# Patient Record
Sex: FEMALE | Race: WHITE | URBAN - METROPOLITAN AREA
[De-identification: names, ages, dates, MRNs, and addresses within clinical notes are randomized per-mention and may not be internally consistent; named-entity substitution may affect disease eponyms.]

---

## 2017-03-13 ENCOUNTER — TELEPHONE (OUTPATIENT)
Dept: INTERNAL MEDICINE | Facility: CLINIC | Age: 37
End: 2017-03-13

## 2017-03-13 DIAGNOSIS — R07.0 THROAT PAIN: Primary | ICD-10-CM

## 2017-03-13 NOTE — TELEPHONE ENCOUNTER
Patient called back, she will try to snag a rapid strep swab test where she works and fax it to us.  She does not need Gretel's order sent anywhere.  RN gave main clinic fax number to send results to.    Will leave this open awaiting lab results.    Teresa Andrade RN  [facility]

## 2017-03-13 NOTE — TELEPHONE ENCOUNTER
Patient back to RN Triage line, left VM to reach her best at: [phone].    Teresa Andrade RN  [facility]

## 2017-03-13 NOTE — TELEPHONE ENCOUNTER
Patient needs to complete a strep test. She can either be seen in clinic, or schedule a lab test. I ordered a strep test in case she just wants to make a lab visit. She can schedule a lab appointment to complete. If positive, I will send in a prescription for Abx. Please let me know what pharmacy she prefers.

## 2017-03-13 NOTE — TELEPHONE ENCOUNTER
Reason for call:  Patient reporting a symptom    Symptom or request: Patient suspects that she has strep throat as both of her children tested positive. She has a sore throat and body aches.  She is wondering if she can get a script for Amoxicillin.    Duration (how long have symptoms been present): Since Saturday    Have you been treated for this before? No    Additional comments: [facility] in [place]    Phone Number patient can be reached at:  Cell number on file:    Telephone Information:   Mobile [phone]     Pager: [phone]  Best Time:  any    Can we leave a detailed message on this number:  YES    Call taken on 3/13/2017 at 9:21 AM by Aurelia Ruiz

## 2017-03-13 NOTE — TELEPHONE ENCOUNTER
Attempted to call patient at [phone], no answer.   Left VM to return call to RN triage line.     Angelique Philip RN  [facility]

## 2017-03-13 NOTE — TELEPHONE ENCOUNTER
Called and spoke with patient.  Both of her children have strep and she thinks she also got this.  Feels exactly like her symptoms did in November 2016.  She is experiencing a low-grade temp but still able to swallow.  She would like the previously-prescribed PCN to be Rx'd again.    Routed to provider.    Teresa Andrade RN  [facility]

## 2017-03-13 NOTE — TELEPHONE ENCOUNTER
RN called [phone] and left numeric page to call to RN triage line.     Angelique Philip RN  [facility]

## 2017-03-16 NOTE — TELEPHONE ENCOUNTER
"Attempted to call patient at home number, left message on voicemail identified as \"Karolina Jackman\" requesting call back to clinic to discuss.  I don't see any faxed results in Epic.  Anika Berman RN  [facility]    "

## 2020-03-02 ENCOUNTER — HEALTH MAINTENANCE LETTER (OUTPATIENT)
Age: 40
End: 2020-03-02

## 2020-12-20 ENCOUNTER — HEALTH MAINTENANCE LETTER (OUTPATIENT)
Age: 40
End: 2020-12-20

## 2021-04-18 ENCOUNTER — HEALTH MAINTENANCE LETTER (OUTPATIENT)
Age: 41
End: 2021-04-18

## 2021-10-03 ENCOUNTER — HEALTH MAINTENANCE LETTER (OUTPATIENT)
Age: 41
End: 2021-10-03

## 2022-05-15 ENCOUNTER — HEALTH MAINTENANCE LETTER (OUTPATIENT)
Age: 42
End: 2022-05-15

## 2022-09-04 ENCOUNTER — HEALTH MAINTENANCE LETTER (OUTPATIENT)
Age: 42
End: 2022-09-04

## 2023-06-03 ENCOUNTER — HEALTH MAINTENANCE LETTER (OUTPATIENT)
Age: 43
End: 2023-06-03

## 2024-02-18 ENCOUNTER — HEALTH MAINTENANCE LETTER (OUTPATIENT)
Age: 44
End: 2024-02-18